# Patient Record
Sex: OTHER/UNKNOWN | Race: WHITE | URBAN - METROPOLITAN AREA
[De-identification: names, ages, dates, MRNs, and addresses within clinical notes are randomized per-mention and may not be internally consistent; named-entity substitution may affect disease eponyms.]

---

## 2017-07-21 ENCOUNTER — APPOINTMENT (OUTPATIENT)
Dept: URBAN - METROPOLITAN AREA CLINIC 290 | Age: 42
Setting detail: DERMATOLOGY
End: 2017-07-21

## 2017-07-21 DIAGNOSIS — B07.0 PLANTAR WART: ICD-10-CM

## 2017-07-21 PROCEDURE — OTHER COUNSELING: OTHER

## 2017-07-21 PROCEDURE — 99201: CPT | Mod: 25

## 2017-07-21 PROCEDURE — OTHER LIQUID NITROGEN: OTHER

## 2017-07-21 PROCEDURE — 17110 DESTRUCT B9 LESION 1-14: CPT

## 2017-07-21 ASSESSMENT — LOCATION DETAILED DESCRIPTION DERM: LOCATION DETAILED: LEFT MEDIAL PLANTAR MIDFOOT

## 2017-07-21 ASSESSMENT — LOCATION SIMPLE DESCRIPTION DERM: LOCATION SIMPLE: LEFT PLANTAR SURFACE

## 2017-07-21 ASSESSMENT — LOCATION ZONE DERM: LOCATION ZONE: FEET

## 2017-07-21 NOTE — PROCEDURE: LIQUID NITROGEN
Consent: Informed of possible scar, blister and reoccurance. ICG given by patient and parent. \\Demetri.
Add 52 Modifier (Optional): no
Medical Necessity Information: It is in your best interest to select a reason for this procedure from the list below. All of these items fulfill various CMS LCD requirements except the new and changing color options.
Detail Level: Detailed

## 2017-08-07 ENCOUNTER — APPOINTMENT (OUTPATIENT)
Dept: URBAN - METROPOLITAN AREA CLINIC 290 | Age: 42
Setting detail: DERMATOLOGY
End: 2017-08-07

## 2017-08-07 DIAGNOSIS — B07.0 PLANTAR WART: ICD-10-CM

## 2017-08-07 PROCEDURE — OTHER LIQUID NITROGEN: OTHER

## 2017-08-07 PROCEDURE — 17110 DESTRUCT B9 LESION 1-14: CPT

## 2017-08-07 PROCEDURE — OTHER COUNSELING: OTHER

## 2017-08-07 ASSESSMENT — LOCATION DETAILED DESCRIPTION DERM: LOCATION DETAILED: LEFT MEDIAL PLANTAR MIDFOOT

## 2017-08-07 ASSESSMENT — LOCATION SIMPLE DESCRIPTION DERM: LOCATION SIMPLE: LEFT PLANTAR SURFACE

## 2017-08-07 ASSESSMENT — LOCATION ZONE DERM: LOCATION ZONE: FEET

## 2017-08-07 NOTE — PROCEDURE: LIQUID NITROGEN
Add 52 Modifier (Optional): no
Detail Level: Detailed
Pared With?: 15 blade
Medical Necessity Information: It is in your best interest to select a reason for this procedure from the list below. All of these items fulfill various CMS LCD requirements except the new and changing color options.
Consent: Informed of possible scar, blister and reoccurance. ICG given by patient and parent. \\Demetri.

## 2017-09-13 ENCOUNTER — APPOINTMENT (OUTPATIENT)
Dept: URBAN - METROPOLITAN AREA CLINIC 290 | Age: 42
Setting detail: DERMATOLOGY
End: 2017-09-13

## 2017-09-13 DIAGNOSIS — B07.0 PLANTAR WART: ICD-10-CM

## 2017-09-13 PROCEDURE — 17110 DESTRUCT B9 LESION 1-14: CPT

## 2017-09-13 PROCEDURE — OTHER LIQUID NITROGEN: OTHER

## 2017-09-13 PROCEDURE — OTHER COUNSELING: OTHER

## 2017-09-13 ASSESSMENT — LOCATION SIMPLE DESCRIPTION DERM: LOCATION SIMPLE: LEFT PLANTAR SURFACE

## 2017-09-13 ASSESSMENT — LOCATION ZONE DERM: LOCATION ZONE: FEET

## 2017-09-13 ASSESSMENT — LOCATION DETAILED DESCRIPTION DERM
LOCATION DETAILED: LEFT LATERAL PLANTAR MIDFOOT
LOCATION DETAILED: LEFT MEDIAL PLANTAR MIDFOOT

## 2017-09-13 NOTE — PROCEDURE: LIQUID NITROGEN
Render Post-Care Instructions In Note?: no
Medical Necessity Information: It is in your best interest to select a reason for this procedure from the list below. All of these items fulfill various CMS LCD requirements except the new and changing color options.
Pared With?: 15 blade
Consent: Informed of possible scar, blister and reoccurance. ICG given by patient and parent. \\Demetri.
Detail Level: Detailed